# Patient Record
Sex: FEMALE | HISPANIC OR LATINO | URBAN - METROPOLITAN AREA
[De-identification: names, ages, dates, MRNs, and addresses within clinical notes are randomized per-mention and may not be internally consistent; named-entity substitution may affect disease eponyms.]

---

## 2017-11-21 ENCOUNTER — EMERGENCY (EMERGENCY)
Facility: HOSPITAL | Age: 57
LOS: 0 days | Discharge: HOME | End: 2017-11-21
Admitting: INTERNAL MEDICINE

## 2017-11-21 DIAGNOSIS — I10 ESSENTIAL (PRIMARY) HYPERTENSION: ICD-10-CM

## 2017-11-21 DIAGNOSIS — M25.562 PAIN IN LEFT KNEE: ICD-10-CM

## 2017-11-21 DIAGNOSIS — Z88.8 ALLERGY STATUS TO OTHER DRUGS, MEDICAMENTS AND BIOLOGICAL SUBSTANCES STATUS: ICD-10-CM

## 2017-11-21 DIAGNOSIS — E11.9 TYPE 2 DIABETES MELLITUS WITHOUT COMPLICATIONS: ICD-10-CM

## 2018-05-30 ENCOUNTER — APPOINTMENT (OUTPATIENT)
Dept: ORTHOPEDIC SURGERY | Facility: CLINIC | Age: 58
End: 2018-05-30

## 2018-05-30 PROBLEM — Z00.00 ENCOUNTER FOR PREVENTIVE HEALTH EXAMINATION: Status: ACTIVE | Noted: 2018-05-30

## 2018-12-20 ENCOUNTER — APPOINTMENT (OUTPATIENT)
Dept: VASCULAR SURGERY | Facility: CLINIC | Age: 58
End: 2018-12-20
Payer: MEDICAID

## 2018-12-20 VITALS
WEIGHT: 202 LBS | DIASTOLIC BLOOD PRESSURE: 80 MMHG | HEIGHT: 59 IN | BODY MASS INDEX: 40.72 KG/M2 | SYSTOLIC BLOOD PRESSURE: 130 MMHG

## 2018-12-20 DIAGNOSIS — I10 ESSENTIAL (PRIMARY) HYPERTENSION: ICD-10-CM

## 2018-12-20 DIAGNOSIS — E78.00 PURE HYPERCHOLESTEROLEMIA, UNSPECIFIED: ICD-10-CM

## 2018-12-20 PROCEDURE — 99203 OFFICE O/P NEW LOW 30 MIN: CPT

## 2018-12-20 PROCEDURE — 93970 EXTREMITY STUDY: CPT

## 2018-12-20 RX ORDER — HYDROCHLOROTHIAZIDE 12.5 MG/1
12.5 TABLET ORAL
Refills: 0 | Status: ACTIVE | COMMUNITY

## 2018-12-20 RX ORDER — OMEPRAZOLE 20 MG/1
TABLET, DELAYED RELEASE ORAL
Refills: 0 | Status: ACTIVE | COMMUNITY

## 2018-12-20 RX ORDER — SIMVASTATIN 40 MG/1
40 TABLET, FILM COATED ORAL
Refills: 0 | Status: ACTIVE | COMMUNITY

## 2019-03-13 ENCOUNTER — APPOINTMENT (OUTPATIENT)
Dept: VASCULAR SURGERY | Facility: CLINIC | Age: 59
End: 2019-03-13

## 2019-09-12 ENCOUNTER — APPOINTMENT (OUTPATIENT)
Dept: VASCULAR SURGERY | Facility: CLINIC | Age: 59
End: 2019-09-12
Payer: MEDICAID

## 2019-09-12 PROCEDURE — 99213 OFFICE O/P EST LOW 20 MIN: CPT

## 2019-09-12 NOTE — ASSESSMENT
[FreeTextEntry1] : 58 y/o female with varicose veins with symptoms of leg pain, leg heaviness and swelling at the end of the day or walking for long periods, mostly left leg. Denies any h/o DVT, ulcerations or bleeding varix. Venous duplex done previously showed no evidence of DVT bilaterally. 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 She will benefit EVLT for vein closure of left GSV, to prevent complications of venous hypertension such as venous stasis dermatitis, ulcerations and chronic leg swelling. She wants to proceed and will be scheduled for the procedure once insurance approval is obtained.

## 2019-09-12 NOTE — HISTORY OF PRESENT ILLNESS
[FreeTextEntry1] : 58 y/o female with varicose veins with symptoms of leg pain, leg heaviness and swelling at the end of the day or walking for long periods, mostly left leg. Denies any h/o DVT, ulcerations or bleeding varix.

## 2019-09-25 ENCOUNTER — APPOINTMENT (OUTPATIENT)
Dept: VASCULAR SURGERY | Facility: CLINIC | Age: 59
End: 2019-09-25
Payer: MEDICAID

## 2019-09-25 PROCEDURE — 36478 ENDOVENOUS LASER 1ST VEIN: CPT | Mod: LT

## 2019-09-25 NOTE — PROCEDURE
[FreeTextEntry3] : Procedural Report\par Name: ERIS STERN  \par MRN: [id number] \par : 1960 \par Requesting MD: SMILEY JARAMILLO \par Exam:  EVLT - Laser\par DOS: 2019\par \par History: 59 year year old F with insufficiency o left great saphenous vein referred for endovenous laser ablation of the saphenous vein.\par \par Anesthesia:  local only\par \par Procedure time: 1hour\par \par Procedure and Findings:  Informed consent was obtained from the patient prior to this procedure.  Continuous physiological monitoring of the patient’s vital signs was performed throughout the procedure.\par \par The patient was brought into the procedure suite.  The patient was then placed supine on the procedure table and the leg prepped and draped in the usual sterile fashion.  The saphenous vein was accessed in the thigh under ultrasound guidance using a 21 gauge micropuncture needle.  The needle was exchanged over a 0.018 inch guide wire for a 4 Congolese tapered dilator.  After exchanging for a 0.035 inch guide wire, a five Congolese 45 cm long sheath was advanced to the saphenofemoral junction.  An AngioDynamics laser fiber was then advanced through the sheath to the saphenofemoral junction.  Tumescent anesthesia using a .25% lidocaine solution was administered along the entire course of the vein under ultrasound guidance.  Compression of the vein was noted throughout its course.  After re-determining that the tip of the fiber was below the saphenofemoral junction, the laser was activated to 8 Fraser energy and slowly withdrawn with the sheath.  Total pullback time was approximately 105  seconds.  Total energy delivered was 832 J.  Total vein length treated was 19 cm.  The laser was deactivated approximately 2 cm proximal to the puncture site.  The sheath and fiber was then removed.  Repeat ultrasound exam demonstrate no flow within the saphenous vein.  The common femoral vein remains patent.  A grade 2 thigh high compression stocking was then applied.  The patient tolerated the procedure without incident.\par \par Impression:  Successful endovenous laser ablation of left greater saphenous vein as described above.\par

## 2019-09-25 NOTE — PROCEDURE
[FreeTextEntry3] : Procedural Report\par Name: ERIS STERN  \par MRN: [id number] \par : 1960 \par Requesting MD: SMILEY JARAMILLO \par Exam:  EVLT - Laser\par DOS: 2019\par \par History: 59 year year old F with insufficiency o left great saphenous vein referred for endovenous laser ablation of the saphenous vein.\par \par Anesthesia:  local only\par \par Procedure time: 1hour\par \par Procedure and Findings:  Informed consent was obtained from the patient prior to this procedure.  Continuous physiological monitoring of the patient’s vital signs was performed throughout the procedure.\par \par The patient was brought into the procedure suite.  The patient was then placed supine on the procedure table and the leg prepped and draped in the usual sterile fashion.  The saphenous vein was accessed in the thigh under ultrasound guidance using a 21 gauge micropuncture needle.  The needle was exchanged over a 0.018 inch guide wire for a 4 Qatari tapered dilator.  After exchanging for a 0.035 inch guide wire, a five Qatari 45 cm long sheath was advanced to the saphenofemoral junction.  An AngioDynamics laser fiber was then advanced through the sheath to the saphenofemoral junction.  Tumescent anesthesia using a .25% lidocaine solution was administered along the entire course of the vein under ultrasound guidance.  Compression of the vein was noted throughout its course.  After re-determining that the tip of the fiber was below the saphenofemoral junction, the laser was activated to 8 Fraser energy and slowly withdrawn with the sheath.  Total pullback time was approximately 105  seconds.  Total energy delivered was 832 J.  Total vein length treated was 19 cm.  The laser was deactivated approximately 2 cm proximal to the puncture site.  The sheath and fiber was then removed.  Repeat ultrasound exam demonstrate no flow within the saphenous vein.  The common femoral vein remains patent.  A grade 2 thigh high compression stocking was then applied.  The patient tolerated the procedure without incident.\par \par Impression:  Successful endovenous laser ablation of left greater saphenous vein as described above.\par

## 2019-10-10 ENCOUNTER — OTHER (OUTPATIENT)
Age: 59
End: 2019-10-10

## 2019-10-10 ENCOUNTER — APPOINTMENT (OUTPATIENT)
Dept: VASCULAR SURGERY | Facility: CLINIC | Age: 59
End: 2019-10-10
Payer: MEDICAID

## 2019-10-10 DIAGNOSIS — I83.893 VARICOSE VEINS OF BILATERAL LOWER EXTREMITIES WITH OTHER COMPLICATIONS: ICD-10-CM

## 2019-10-10 PROCEDURE — 93971 EXTREMITY STUDY: CPT

## 2019-10-10 PROCEDURE — 99213 OFFICE O/P EST LOW 20 MIN: CPT

## 2019-10-10 NOTE — HISTORY OF PRESENT ILLNESS
[FreeTextEntry1] : 58 y/o female with symptomatic varicose veins on left lower extremity and GSV incompetency underwent EVLT on 9/25/19, presents for follow up.

## 2019-10-10 NOTE — DATA REVIEWED
[FreeTextEntry1] : I performed a venous duplex which was medically necessary to evaluate for left GSV closure.\par The main trunk of the great saphenous vein is closed from the groin to the distal thigh. There is no evidence of acute deep venous thrombosis or superficial thrombophlebitis. All major veins are patent and compressible with normal Spectral Doppler waveform analysis.

## 2019-10-10 NOTE — ASSESSMENT
[FreeTextEntry1] : 60 y/o female with symptomatic varicose veins on left lower extremity and GSV incompetency underwent EVLT on 9/25/19, presents for follow up. Venous duplex showed closure of left GSV and no evidence of DVT. She is very happy with the results and no further treatment is needed at this time. She can follow up as needed.

## 2023-09-04 NOTE — CONSULT LETTER
Used Juanis  for triage   [Dear  ___] : Dear  [unfilled], [Courtesy Letter:] : I had the pleasure of seeing your patient, [unfilled], in my office today. [Please see my note below.] : Please see my note below.

## 2024-03-28 ENCOUNTER — OUTPATIENT (OUTPATIENT)
Dept: OUTPATIENT SERVICES | Facility: HOSPITAL | Age: 64
LOS: 1 days | End: 2024-03-28
Payer: SELF-PAY

## 2024-03-28 DIAGNOSIS — K03.81 CRACKED TOOTH: ICD-10-CM

## 2024-03-28 DIAGNOSIS — K02.9 DENTAL CARIES, UNSPECIFIED: ICD-10-CM

## 2024-03-28 PROCEDURE — D0220: CPT

## 2024-03-28 PROCEDURE — D7140: CPT
